# Patient Record
Sex: FEMALE | Race: WHITE | NOT HISPANIC OR LATINO | Employment: FULL TIME | URBAN - METROPOLITAN AREA
[De-identification: names, ages, dates, MRNs, and addresses within clinical notes are randomized per-mention and may not be internally consistent; named-entity substitution may affect disease eponyms.]

---

## 2021-08-05 ENCOUNTER — OFFICE VISIT (OUTPATIENT)
Dept: INTERNAL MEDICINE | Facility: CLINIC | Age: 26
End: 2021-08-05
Payer: COMMERCIAL

## 2021-08-05 VITALS
SYSTOLIC BLOOD PRESSURE: 115 MMHG | OXYGEN SATURATION: 97 % | HEART RATE: 78 BPM | DIASTOLIC BLOOD PRESSURE: 80 MMHG | TEMPERATURE: 98.5 F | WEIGHT: 181.9 LBS

## 2021-08-05 DIAGNOSIS — M79.661 PAIN IN RIGHT SHIN: ICD-10-CM

## 2021-08-05 DIAGNOSIS — Z00.00 PREVENTATIVE HEALTH CARE: ICD-10-CM

## 2021-08-05 DIAGNOSIS — Z76.89 ESTABLISHING CARE WITH NEW DOCTOR, ENCOUNTER FOR: Primary | ICD-10-CM

## 2021-08-05 PROCEDURE — 99202 OFFICE O/P NEW SF 15 MIN: CPT | Mod: GE | Performed by: STUDENT IN AN ORGANIZED HEALTH CARE EDUCATION/TRAINING PROGRAM

## 2021-08-05 RX ORDER — FLUOXETINE 10 MG/1
10 CAPSULE ORAL DAILY
COMMUNITY
End: 2023-04-07

## 2021-08-05 ASSESSMENT — ANXIETY QUESTIONNAIRES
3. WORRYING TOO MUCH ABOUT DIFFERENT THINGS: SEVERAL DAYS
1. FEELING NERVOUS, ANXIOUS, OR ON EDGE: SEVERAL DAYS
5. BEING SO RESTLESS THAT IT IS HARD TO SIT STILL: SEVERAL DAYS
2. NOT BEING ABLE TO STOP OR CONTROL WORRYING: SEVERAL DAYS
IF YOU CHECKED OFF ANY PROBLEMS ON THIS QUESTIONNAIRE, HOW DIFFICULT HAVE THESE PROBLEMS MADE IT FOR YOU TO DO YOUR WORK, TAKE CARE OF THINGS AT HOME, OR GET ALONG WITH OTHER PEOPLE: SOMEWHAT DIFFICULT
GAD7 TOTAL SCORE: 8
6. BECOMING EASILY ANNOYED OR IRRITABLE: MORE THAN HALF THE DAYS
7. FEELING AFRAID AS IF SOMETHING AWFUL MIGHT HAPPEN: SEVERAL DAYS

## 2021-08-05 ASSESSMENT — PAIN SCALES - GENERAL: PAINLEVEL: MILD PAIN (2)

## 2021-08-05 ASSESSMENT — PATIENT HEALTH QUESTIONNAIRE - PHQ9
5. POOR APPETITE OR OVEREATING: SEVERAL DAYS
SUM OF ALL RESPONSES TO PHQ QUESTIONS 1-9: 4

## 2021-08-05 NOTE — PROGRESS NOTES
PRIMARY CARE CENTER         HPI:       Michelle Navas is a 26 year old female who presents to [facility] care.     Past Medical History   Anxiety disorder on prozac and goes to therapy for the last year     Family History   Heart disease in both grandpas   HTN - Mother and maternal grandfather   T2DM - paternal grandmother and grandfather     Surgical History   Orthopedic surgery on her wrist ~2015    Social History   Tobacco use: Never used   Alcohol use: 4 drinks weekly   Drug use: None  Sexual activity: 1 male partner,  - no current concerns with birth control     HCM Needs:  - COVID vaccine   - PAP, last done in the last 1.5 years      Active concerns:  Concern for shin split, more pain walking in her shin and inner ankle of the for the last few months. No trauma but does run. Hit her tail bone many months back has some soreness intermittently denies any symptoms of weakness or loss of bladder or bowel function.     Problem, Medication and Allergy Lists were reviewed and are current.  Patient is a new patient to this clinic and so  I reviewed/updated the Past Medical History, the Family History and the Social History.          Review of Systems:     ROS  I have personally reviewed and updated the complete ROS on the day of the visit.           Physical Exam:   /80   Pulse 78   Temp 98.5  F (36.9  C) (Oral)   Wt 82.5 kg (181 lb 14.4 oz)   SpO2 97%   There is no height or weight on file to calculate BMI.  Vitals were reviewed       GENERAL APPEARANCE: healthy, alert and no distress     EYES: EOMI, PERRL     RESP: lungs clear to auscultation - no rales, rhonchi or wheezes     CV: regular rates and rhythm, normal S1 S2, no S3 or S4 and no murmur, click or rub     MS: extremities normal- no gross deformities noted, no evidence of inflammation in joints, FROM in all extremities.     SKIN: no suspicious lesions or rashes     NEURO: Normal strength and tone, sensory exam grossly normal, mentation  intact and speech normal     PSYCH: mentation appears normal. and affect normal/bright        Results:     Results from last visit:  No results found for any previous visit.     Assessment and Plan     Michelle was seen today for establish care.    Diagnoses and all orders for this visit:    Establishing care with new doctor, encounter for  Preventative health care  Up to date on HCM today.    Pain in right shin  No concerning findings on exam could be stress fracture. XR likely not sensitive to pick this up. Will refer to sports med for appropriate work up and pt has questions about her walking and running gait could also do PT.   -     Orthopedic  Referral; Future        Options for treatment and follow-up care were reviewed with the patient. Michelle Navas engaged in the decision making process and verbalized understanding of the options discussed and agreed with the final plan.    Mirit Mohsen Yacoup, MD  Aug 5, 2021    Pt was seen and plan of care discussed with Dr Rozina Allen.

## 2021-08-05 NOTE — NURSING NOTE
26 year old  Chief Complaint   Patient presents with     Establish Care     Pt is here to establish care.       Blood pressure 115/80, pulse 78, temperature 98.5  F (36.9  C), temperature source Oral, weight 82.5 kg (181 lb 14.4 oz), SpO2 97 %. There is no height or weight on file to calculate BMI.  BP completed using cuff size:      Analisa Marquez, WARD  August 5, 2021 12:47 PM

## 2021-08-05 NOTE — PATIENT INSTRUCTIONS
Primary Care Center Phone Number [phone]  Primary Care Center Medication Refill Request Information:  * Please contact your pharmacy regarding ANY request for medication refills.  ** [facility] Prescription Fax = [phone]  * Please allow 3 business days for routine medication refills.  * Please allow 5 business days for controlled substance medication refills.     Primary Care Center Test Result notification information:  *You will be notified with in 7-10 days of your appointment day regarding the results of your test.  If you are on MyChart you will be notified as soon as the provider has reviewed the results and signed off on them.

## 2021-08-06 ASSESSMENT — ANXIETY QUESTIONNAIRES: GAD7 TOTAL SCORE: 8

## 2021-08-06 NOTE — TELEPHONE ENCOUNTER
DIAGNOSIS: Pain and Twisting sensation in the R shin/ankle/leg   APPOINTMENT DATE: 8/12/2021   NOTES STATUS DETAILS   OFFICE NOTE from referring provider Internal [facility]:  8/5/21 - PCC OV with Dr. Mcelroy   OFFICE NOTE from other specialist N/A    DISCHARGE SUMMARY from hospital N/A    DISCHARGE REPORT from the ER Care [facility]:  7/26/13 - ED OV with Florentino Rosario NP   OPERATIVE REPORT N/A    EMG report N/A    MEDICATION LIST Internal    MRI N/A    DEXA (osteoporosis/bone health) N/A    CT SCAN N/A    XRAYS (IMAGES & REPORTS) N/A

## 2021-08-12 ENCOUNTER — OFFICE VISIT (OUTPATIENT)
Dept: ORTHOPEDICS | Facility: CLINIC | Age: 26
End: 2021-08-12
Payer: COMMERCIAL

## 2021-08-12 ENCOUNTER — PRE VISIT (OUTPATIENT)
Dept: ORTHOPEDICS | Facility: CLINIC | Age: 26
End: 2021-08-12

## 2021-08-12 VITALS — WEIGHT: 180 LBS | HEIGHT: 66 IN | BODY MASS INDEX: 28.93 KG/M2

## 2021-08-12 DIAGNOSIS — M79.661 PAIN IN RIGHT SHIN: Primary | ICD-10-CM

## 2021-08-12 DIAGNOSIS — S86.891A RIGHT MEDIAL TIBIAL STRESS SYNDROME, INITIAL ENCOUNTER: ICD-10-CM

## 2021-08-12 PROCEDURE — 99203 OFFICE O/P NEW LOW 30 MIN: CPT | Performed by: FAMILY MEDICINE

## 2021-08-12 ASSESSMENT — MIFFLIN-ST. JEOR: SCORE: 1563.22

## 2021-08-12 NOTE — PROGRESS NOTES
CHIEF COMPLAINT:  Pain of the Right Ankle       HISTORY OF PRESENT ILLNESS  Ms. Navas is a pleasant 26 year old year old female who presents to clinic today with right centeno pain.  Michelle explains that she is having medial shin pain from starting to run after 6 months off during winter.     Onset: gradual  Location: right medial lower leg  Quality:  aching and dull  Duration: 4 months   Severity: 5/10 at worst  Timing:intermittent episodes worse with walking and running  Modifying factors:  resting and non-use makes it better, movement and use makes it worse  Associated signs & symptoms: N/a  Previous similar pain: Has had shin splints prior but not this badly  Treatments to date: Rest, ice, ibuprofen     Additional history: as documented    Review of Systems:    Have you recently had a a fever, chills, weight loss? No    Do you have any vision problems? No    Do you have any chest pain or edema? No    Do you have any shortness of breath or wheezing?  No    Do you have stomach problems? No    Do you have any numbness or focal weakness? No    Do you have diabetes? No    Do you have problems with bleeding or clotting? No    Do you have an rashes or other skin lesions? No    MEDICAL HISTORY  There is no problem list on file for this patient.      Current Outpatient Medications   Medication Sig Dispense Refill     FLUoxetine (PROZAC) 10 MG capsule Take 10 mg by mouth daily       Norgestimate-Eth Estradiol (SPRINTEC 28 PO)          No Known Allergies    No family history on file.    Additional medical/Social/Surgical histories reviewed in [facility] and updated as appropriate.       PHYSICAL EXAM  There were no vitals taken for this visit.    General  - normal appearance, in no obvious distress  HEENT  - conjunctivae not injected, moist mucous membranes  CV  - normal pulse at dorsalis pedis and posterior tibial artery  Pulm  - normal respiratory pattern, non-labored  Musculoskeletal - lower leg right  - stance: normal gait  without limp, no obvious leg length discrepancy  - inspection: no swelling or effusion, normal bone and joint alignment, no obvious deformity  - palpation: tender posterior medial cortex at midshaft tibia extending distally just proximal to malleolus, no joint line tenderness, patella and patellar tendon non-tender. Nontender anterior tibial. No swelling in the region of tibia.  - ROM: FROM of knee and ankle, painless  - strength: 5/5 in ankle plantarflexion, dorsiflexion, eversion, inversion  Neuro  - no sensory or motor deficit, grossly normal coordination, normal muscle tone  Skin  - no ecchymosis, erythema, warmth, or induration, no obvious rash  Psych  - interactive, appropriate, normal mood and affect    IMAGING : Deferred     ASSESSMENT & PLAN  Ms. Navas is a 26 year old year old female who presents to clinic today with subacute medial tibial pain diffusely about the right lower extremity. Findings consistent with medial tibial stress syndrome.    Diagnosis: Medial tibial stress syndrome of right lower extremity    Treatment options discussed at this time I like her to start a robust home exercise program for stretching her lower leg musculature as well as strengthening. We reviewed crosstraining methods. Footwear was also reviewed and she does have the appropriate running shoe. She should gradually increase her high-impact activity as pain subsides.    Given duration of her symptoms, I did encourage her to attend formal physical therapy, but she would like to give some effort in the home exercise program first. If no improvement in the next 6 weeks would like to see her back, obtain x-rays if no improvement.    It was a pleasure seeing Michelle today.    Florentino Mckeon DO, CAQSM  Primary Care Sports Medicine

## 2021-08-12 NOTE — LETTER
8/12/2021      RE: Michelle Navas  [address] [place] [address]       CHIEF COMPLAINT:  Pain of the Right Ankle       HISTORY OF PRESENT ILLNESS  Ms. Navas is a pleasant 26 year old year old female who presents to clinic today with right centeno pain.  Michelle explains that she is having medial shin pain from starting to run after 6 months off during winter.     Onset: gradual  Location: right medial lower leg  Quality:  aching and dull  Duration: 4 months   Severity: 5/10 at worst  Timing:intermittent episodes worse with walking and running  Modifying factors:  resting and non-use makes it better, movement and use makes it worse  Associated signs & symptoms: N/a  Previous similar pain: Has had shin splints prior but not this badly  Treatments to date: Rest, ice, ibuprofen     Additional history: as documented    Review of Systems:    Have you recently had a a fever, chills, weight loss? No    Do you have any vision problems? No    Do you have any chest pain or edema? No    Do you have any shortness of breath or wheezing?  No    Do you have stomach problems? No    Do you have any numbness or focal weakness? No    Do you have diabetes? No    Do you have problems with bleeding or clotting? No    Do you have an rashes or other skin lesions? No    MEDICAL HISTORY  There is no problem list on file for this patient.      Current Outpatient Medications   Medication Sig Dispense Refill     FLUoxetine (PROZAC) 10 MG capsule Take 10 mg by mouth daily       Norgestimate-Eth Estradiol (SPRINTEC 28 PO)          No Known Allergies    No family history on file.    Additional medical/Social/Surgical histories reviewed in [facility] and updated as appropriate.       PHYSICAL EXAM  There were no vitals taken for this visit.    General  - normal appearance, in no obvious distress  HEENT  - conjunctivae not injected, moist mucous membranes  CV  - normal pulse at dorsalis pedis and posterior tibial artery  Pulm  - normal  respiratory pattern, non-labored  Musculoskeletal - lower leg right  - stance: normal gait without limp, no obvious leg length discrepancy  - inspection: no swelling or effusion, normal bone and joint alignment, no obvious deformity  - palpation: tender posterior medial cortex at midshaft tibia extending distally just proximal to malleolus, no joint line tenderness, patella and patellar tendon non-tender. Nontender anterior tibial. No swelling in the region of tibia.  - ROM: FROM of knee and ankle, painless  - strength: 5/5 in ankle plantarflexion, dorsiflexion, eversion, inversion  Neuro  - no sensory or motor deficit, grossly normal coordination, normal muscle tone  Skin  - no ecchymosis, erythema, warmth, or induration, no obvious rash  Psych  - interactive, appropriate, normal mood and affect    IMAGING : Deferred     ASSESSMENT & PLAN  Ms. Navas is a 26 year old year old female who presents to clinic today with subacute medial tibial pain diffusely about the right lower extremity. Findings consistent with medial tibial stress syndrome.    Diagnosis: Medial tibial stress syndrome of right lower extremity    Treatment options discussed at this time I like her to start a robust home exercise program for stretching her lower leg musculature as well as strengthening. We reviewed crosstraining methods. Footwear was also reviewed and she does have the appropriate running shoe. She should gradually increase her high-impact activity as pain subsides.    Given duration of her symptoms, I did encourage her to attend formal physical therapy, but she would like to give some effort in the home exercise program first. If no improvement in the next 6 weeks would like to see her back, obtain x-rays if no improvement.    It was a pleasure seeing Michelle today.    Florentino Mckeon DO, [facility]  Primary Care Sports Medicine      Florentino Mckeon DO

## 2021-08-12 NOTE — PATIENT INSTRUCTIONS
SHIN SPLINTS (MEDIAL TIBIAL STRESS SYNDROME) HANDOUT    WHAT IS SHIN PAIN?    Shin pain is pain on the front of your lower leg between the knee and the ankle. It can hurt directly over your shinbone (tibia) or over the muscles that are on the inner or outer side of the tibia. Shin pain has often been called shin splints.    HOW DOES IT OCCUR?    Shin pain generally occurs from overuse. This problem can come from irritation of the muscles or other tissues in the lower leg or from a stress fracture. This injury is most common in runners who increase their mileage or the intensity of their running, or who change the surface on which they are running.    When you walk or run your foot normally flattens out a small amount when it strikes the ground. If your foot flattens out more than normal it is called over-pronation. Over-pronation can contribute to shin pain.    Some specific conditions that cause shin pain include:    Stress fracture: This is a hairline crack in one of the lower leg bones, the tibia or fibula.  Medial tibial stress syndrome: This is when the muscles that attach to the inner side of your tibia are inflamed.    Compartment syndrome: Your anterior compartment is an area in your leg that contains the muscles that point your foot and toes toward your body. Your lateral compartment contains muscles that move your foot and ankle away from your body. Your posterior compartment contains the calf muscles which point your foot downwards. When a compartment is overused the muscles will become painful.    WHAT ARE THE SYMPTOMS?    You have pain over the front part of your lower leg. You may have pain during exercise, at rest, or both. Stress fractures of the tibia cause pain directly over your shinbone. It will hurt to touch the part of the bone that is fractured. Stress fractures of the fibula cause pain on the outer side of your lower leg. With medial tibial stress syndrome, you will have pain and tenderness  along the edge of the shinbone, especially along the muscles. With compartment syndrome the muscles in that area will be painful. Blood vessels and nerves are also in the anterior compartment. If the muscles in this compartment become swollen during exercise they may irritate these nerves or blood vessels and your foot may become weak, numb, or cold.    HOW IS IT DIAGNOSED?    Your healthcare provider will examine your lower leg. He or she will decide which part of your shin is the source of the pain. Your provider may watch you walk or run to see if you have problems with over-pronation. You may need an X-ray, MRI, or a bone scan to check for stress fractures. If your provider thinks you have compartment syndrome you may need a test that measures the pressure in your lower leg compartments. This is done using a needle attached to a measuring device. The test is done at rest and then again after exercise.    HOW IS IT TREATED?    Treatment may include:    Put an ice pack, gel pack, or package of frozen vegetables, wrapped in a cloth on the area every 3 to 4 hours, for up to 20 minutes at a time.    You could also do ice massage. To do this, first freeze water in a Styrofoam cup, then peel the top of the cup away to expose the ice. Hold the bottom of the cup and rub the ice over the area for 5 to 10 minutes. Do this several times a day while you have pain.    Raise your legs on a pillow when you sit or lie down.    Take an anti-inflammatory medicine such as ibuprofen, or other medicine as directed by your provider. Nonsteroidal anti-inflammatory medicines (NSAIDs) may cause stomach bleeding and other problems. These risks increase with age. Read the label and take as directed. Unless recommended by your healthcare provider, do not take for more than 10 days.    Your healthcare provider may recommend arch supports, called orthotics. You can buy orthotics at a pharmacy or athletic shoe store or they can be custom-made.  Arch supports (orthotics) help correct over-pronation.    Follow your provider s instructions for doing exercises to help you recover.    While you are recovering from your injury, you will need to change your sport or activity to one that does not make your condition worse. For example, you may need to bicycle or swim instead of run. When you begin to run again, you should wear good shoes and run on soft surfaces.    WHEN CAN I RETURN TO MY NORMAL ACTIVITIES?    Everyone recovers from an injury at a different rate. Return to your activities depends on how soon your leg recovers, not by how many days or weeks it has been since your injury has occurred. In general, the longer you have symptoms before you start treatment, the longer it will take to get better. The goal is to return to your normal activities as soon as is safely possible. If you return too soon you may worsen your injury.    You may safely return to your activities when, starting from the top of the list and progressing to the end, each of the following is true:    You have full range of motion in the injured leg compared to the uninjured leg.  You have full strength of the injured leg compared to the uninjured leg.  You can walk straight ahead without pain or limping.    HOW CAN I PREVENT SHIN PAIN?    Since shin pain usually occurs from overuse, be sure to start your activities gradually.  Wear shoes with proper padding and support.  Run on softer surfaces.  Warm up properly and stretch the muscles in the front of your leg and in your calf.  Do not keep running while you have shin pain or it will take longer for the pain to go away.  Try cross training to limit the stress on your shins. Do an exercise that does not place stress on the injured tissue, such as swimming. This will allow you to keep exercising while your injury heals.    Developed by [employer].  Published by [employer].  Copyright  2014 [employer] and/or one of its  subsidiaries. All rights reserved.    Exercises: You can start doing all of these exercises right away.    Towel stretch: Sit on a hard surface with your injured leg stretched out in front of you. Loop a towel around your toes and the ball of your foot and pull the towel toward your body keeping your leg straight. Hold this position for 15 to 30 seconds and then relax. Repeat 3 times.  When you don't feel much of a stretch using the towel, do the standing calf stretch instead.    Standing calf stretch: Stand facing a wall with your hands on the wall at about eye level. Keep your injured leg back with your heel on the floor. Keep the other leg forward with the knee bent. Turn your back foot slightly inward (as if you were pigeon-toed). Slowly lean into the wall until you feel a stretch in the back of your calf. Hold the stretch for 15 to 30 seconds. Return to the starting position. Repeat 3 times. Do this exercise several times each day.    Anterior compartment stretch: Stand sideways next to a wall or chair with your injured leg further from the wall or chair. Put one hand on the wall or chair for balance. Bend the knee of your injured leg and grab the front of your foot. Bend the front of the foot toward your heel. You should feel a stretch in the front of your shin. Hold for 15 to 30 seconds. Repeat 3 times.    Resisted ankle dorsiflexion: Tie a knot in one end of the elastic tubing and shut the knot in a door. Tie a loop in the other end of the tubing and put the foot on your injured side through the loop so that the tubing goes around the top of the foot. Sit facing the door with your injured leg straight out in front of you. Move away from the door until there is tension in the tubing. Keeping your leg straight, pull the top of your foot toward your body, stretching the tubing. Slowly return to the starting position. Do 2 sets of 15.    Ankle range of motion: Sit or lie down with your legs straight and your  knees pointing toward the ceiling. Point your toes on your injured side toward your nose, then away from your body. Point your toes in toward your other foot and then out away from your other foot. Finally, move the top of your foot in circles. Move only your foot and ankle. Don't move your leg. Repeat 10 times in each direction. Push hard in all directions.  Heel raise: Stand behind a chair or counter with both feet flat on the floor. Using the chair or counter as a support, rise up onto your toes and hold for 5 seconds. Then slowly lower yourself down without holding onto the support. (It's OK to keep holding onto the support if you need to.) When this exercise becomes less painful, try doing this exercise while you are standing on the injured leg only. Repeat 15 times. Do 2 sets of 15. Rest 30 seconds between sets.    Resisted ankle inversion: Sit with your legs stretched out in front of you. Cross the ankle of your uninjured leg over your other ankle. Wrap elastic tubing around the ball of the foot of your injured leg and then loop it around your other foot so that the tubing is anchored there at one end. Hold the other end of the tubing in your hand. Turn the foot of your injured leg inward and upward. This will stretch the tubing. Return to the starting position. Do 2 sets of 15.  Standing toe raise: Stand with your feet flat on the floor. Rock back onto your heels and lift your toes off the floor. Hold for 5 seconds and then put your toes back on the floor. Do 2 sets of 15.    Balance and reach exercises: Stand next to a chair with your injured leg farther from the chair. The chair will provide support if you need it. Stand on the foot of your injured leg and bend your knee slightly. Try to raise the arch of this foot while keeping your big toe on the floor. Keep your foot in this position.  With the hand that is farther away from the chair, reach forward in front of you by bending at the waist. Avoid bending  your knee any more as you do this. Repeat this 15 times. To make the exercise more challenging, reach farther in front of you. Do 2 sets of 15.    While keeping your arch raised, reach the hand that is farther away from the chair across your body toward the chair. The farther you reach, the more challenging the exercise. Do 2 sets of 15.    Resisted hip abduction: Stand sideways near a door with your injured side further from the door. Tie elastic tubing around the ankle on your injured side. Knot the other end of the tubing and close the knot in the door near the floor. Pull the tubing out to the side, keeping your leg straight. Return to the starting position. Do 2 sets of 15. For more resistance, move farther away from the door.  Repeat this exercise for the other leg.            You may also want to buy a pair of nonprescription foot orthotics from a pharmacy or local sporting goods store. Gigi  and Dr. Francia dennison  are examples of orthotics that you can buy without a prescription.    Developed by [employer].  Published by [employer].  Copyright  2014 [employer] and/or one of its subsidiaries. All rights reserved.

## 2021-10-13 ENCOUNTER — THERAPY VISIT (OUTPATIENT)
Dept: PHYSICAL THERAPY | Facility: CLINIC | Age: 26
End: 2021-10-13
Payer: COMMERCIAL

## 2021-10-13 DIAGNOSIS — M79.661 PAIN IN RIGHT SHIN: ICD-10-CM

## 2021-10-13 PROCEDURE — 97110 THERAPEUTIC EXERCISES: CPT | Mod: GP | Performed by: PHYSICAL THERAPIST

## 2021-10-13 PROCEDURE — 97161 PT EVAL LOW COMPLEX 20 MIN: CPT | Mod: GP | Performed by: PHYSICAL THERAPIST

## 2021-10-13 PROCEDURE — 97530 THERAPEUTIC ACTIVITIES: CPT | Mod: GP | Performed by: PHYSICAL THERAPIST

## 2021-10-13 NOTE — PROGRESS NOTES
KEY PT FINDINGS:  1) Poor arch control in SL stance + SL squatting  2) Weakness through inversion + eversion  3) Poor mechanics through single leg squat    Physical Therapy Initial Evaluation: Subjective History     Injury/Condition Details:  Presenting Complaint Right Smith    Onset Timing/Date June 2021   Mechanism Started getting shin splints in June.   Has been running on and off for years, took a 6 month break. It was fine for a while and then they started. Stopped running in June and started running recently 2 weeks ago.     Previously was doing a jogging here or there - runs casually to be outside. Has been trying to do stretches - they have been helping.     Does cross training: low impact cardio - youtube videos.   Has done jumping stuff in the past - burpees and such - had symptoms.     In the past 2 weeks, minimal symptoms with her runs as she has been taking it easy.   Pain pattern: started within 1 minute of running, would get worse, would have residual pain the next day.      Symptom Behavior Details    Primary Symptoms Sporadic symptoms; Activity/position dependent, pain (Location: medial tibia, goes from the ankle to the knee, not into the foot, Quality: Aching/Throbbing)   Worst Pain 5-6/10 (with running)   Symptom Provocators Walking, impact with burpees   Best Pain 0/10    Symptom Relievers Activity modification   Time of day dependent? No   Recent symptom change? 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    Orthopaedic history See Epic Chart   Notable medical history See Epic Chart   Patient Reported Health good       Foot/Ankle Physical Therapy Examination    Dynamic Movement Screen:  2 leg stance: Mild decrease in arch height bilaterally   2 leg squat: Mild anterior knee  excursion, weight predominantly in the toes.     1 leg stance: Right: Proprio deficits, arch drops and lacks control; Left: Arch does drop but does not shift in and out of arch height  1 leg squat:   Right: Proprio deficit, femoral IR/Add   Left: Proprio deficit, lacking trunk control    Gait: Non-antalgic, lacking arch control.     Range of motion:     Inclinometer Left (degrees) Right (degrees)   Knee Straight     Knee Bent 16 cm 14 cm   Toes Extended/Knee Bent 10 deg 14 deg     Palpation:     Tender to palpation at the following structures: Posterior tib, tibia shaft - more mid shaft for both the muscle + the bone.   NOT tender to palpation at the following structures: Navicular, posterior to the medial malleolus     Resisted Testing:     Right Left   Plantarflexion (Toe Raises) - -   Dorsiflexion - -   Eversion Weak Weak   Inversion Weak Weak   Great Toe Extension - -   Great Toe Flexion - -     Foot Screen:   Normal  Lower Extremity Muscle Strength (x/5): deferred  Assessment/Plan:  Patient is a 26 year old female with right side ankle complaints.    Patient has the following significant findings with corresponding treatment plan.                Diagnosis 1:  Right shin pain  Pain -  hot/cold therapy, manual therapy, STS, splint/taping/bracing/orthotics, self management and education  Decreased ROM/flexibility - manual therapy, therapeutic exercise and home program  Decreased strength - therapeutic exercise, therapeutic activities and home program  Impaired balance - neuro re-education, therapeutic activities and home program  Decreased proprioception - neuro re-education, therapeutic activities and home program  Decreased function - therapeutic activities and home program    Therapy Evaluation Codes:   1) History comprised of:   Personal factors that impact the plan of care:      None.    Comorbidity factors that impact the plan of care are:      None.     Medications impacting care: None.  2) Examination of  Body Systems comprised of:   Body structures and functions that impact the plan of care:      Ankle.   Activity limitations that impact the plan of care are:      Jumping, Running and Walking.  3) Clinical presentation characteristics are:   Stable/Uncomplicated.  4) Decision-Making    Low complexity using standardized patient assessment instrument and/or measureable assessment of functional outcome.  Cumulative Therapy Evaluation is: Low complexity.    Previous and current functional limitations:  (See Goal Flow Sheet for this information)    Short term and Long term goals: (See Goal Flow Sheet for this information)     Communication ability:  Patient appears to be able to clearly communicate and understand verbal and written communication and follow directions correctly.  Treatment Explanation - The following has been discussed with the patient:   RX ordered/plan of care  Anticipated outcomes  Possible risks and side effects  This patient would benefit from PT intervention to resume normal activities.   Rehab potential is good.    Frequency:  1 X week, once daily  Duration:  for 6 weeks  Discharge Plan:  Achieve all LTG.  Independent in home treatment program.  Reach maximal therapeutic benefit.    Please refer to the daily flowsheet for treatment today, total treatment time and time spent performing 1:1 timed codes.

## 2021-11-10 ENCOUNTER — THERAPY VISIT (OUTPATIENT)
Dept: PHYSICAL THERAPY | Facility: CLINIC | Age: 26
End: 2021-11-10
Payer: COMMERCIAL

## 2021-11-10 DIAGNOSIS — M79.661 PAIN IN RIGHT SHIN: Primary | ICD-10-CM

## 2021-11-10 PROCEDURE — 97110 THERAPEUTIC EXERCISES: CPT | Mod: GP | Performed by: PHYSICAL THERAPY ASSISTANT

## 2022-01-30 ENCOUNTER — HEALTH MAINTENANCE LETTER (OUTPATIENT)
Age: 27
End: 2022-01-30

## 2022-02-08 ENCOUNTER — OFFICE VISIT (OUTPATIENT)
Dept: ORTHOPEDICS | Facility: CLINIC | Age: 27
End: 2022-02-08
Payer: COMMERCIAL

## 2022-02-08 ENCOUNTER — ANCILLARY PROCEDURE (OUTPATIENT)
Dept: GENERAL RADIOLOGY | Facility: CLINIC | Age: 27
End: 2022-02-08
Attending: FAMILY MEDICINE
Payer: COMMERCIAL

## 2022-02-08 VITALS — HEIGHT: 66 IN | WEIGHT: 180 LBS | BODY MASS INDEX: 28.93 KG/M2

## 2022-02-08 DIAGNOSIS — M89.8X6 PAIN OF RIGHT TIBIA: Primary | ICD-10-CM

## 2022-02-08 DIAGNOSIS — M79.661 PAIN IN RIGHT SHIN: ICD-10-CM

## 2022-02-08 PROCEDURE — 73590 X-RAY EXAM OF LOWER LEG: CPT | Mod: RT | Performed by: RADIOLOGY

## 2022-02-08 PROCEDURE — 99213 OFFICE O/P EST LOW 20 MIN: CPT | Performed by: FAMILY MEDICINE

## 2022-02-08 ASSESSMENT — MIFFLIN-ST. JEOR: SCORE: 1563.22

## 2022-02-08 NOTE — LETTER
"  2/8/2022      RE: Michelle Colunga  [address] [place] [address]       ESTABLISHED PATIENT FOLLOW-UP:  Pain and Follow Up of the Right Lower Leg       HISTORY OF PRESENT ILLNESS  Ms. Colunga is a pleasant 26 year old year old female who presents to clinic today for follow-up of R shin pain.    Mentions the pain  Had initially been improving with PT and HEP.  Does a fair amount of weight lifting and will incorporate rehab into this.  Pain in her shin never dissipated, now it is more localized to a specific location on her tibia rather than diffuse tibial.    Increased pain with prolonged walking. Notices Sx pretty consistently. Unable to return to running and has backed down d/t pain. Interested in incorporate more dynamic type of cardio which is restricted by her current Sx.    Date of injury: 4/2021  Date last seen: 8/12/21  Following Therapeutic Plan: yes, PT  Pain: unchanged  Function: unchanged     Eats well balanced meals. Dairy intake. Regular menstrual cycles. Does not have a history of stress fractures.    Additional medical/Social/Surgical histories reviewed in [facility] and updated as appropriate.    REVIEW OF SYSTEMS (2/8/2022)  CONSTITUTIONAL: Denies fever and weight loss  GASTROINTESTINAL: Denies abdominal pain, nausea, vomiting  MUSCULOSKELETAL: See HPI  SKIN: Denies any recent rash or lesion  NEUROLOGICAL: Denies numbness or focal weakness     PHYSICAL EXAM  Ht 1.676 m (5' 6\")   Wt 81.6 kg (180 lb)   BMI 29.05 kg/m      General  - normal appearance, in no obvious distress  Musculoskeletal - lower leg right  - stance: normal gait without limp, no obvious leg length discrepancy  - inspection: no swelling or effusion, normal bone and joint alignment, no obvious deformity  - palpation: 3 cm tender region at anterior cortex at midshaft tibia approximately 25cm proximal to medial malleolus, no joint line tenderness, patella and patellar tendon non-tender. Nontender anterior tibial. No swelling in " the region of tibia.  - ROM: FROM of knee and ankle, painless  - strength: 5/5 in ankle plantarflexion, dorsiflexion, eversion, inversion  (+) Hop test right  Neuro  - no sensory or motor deficit, grossly normal coordination, normal muscle tone  Skin  - no ecchymosis, erythema, warmth, or induration, no obvious rash  Psych  - interactive, appropriate, normal mood and affect    IMAGING : XR tibia/fibula right. Final results and radiologist's interpretation, available in the [facility] health record. Images were reviewed with the patient/family members in the office today. My personal interpretation of the performed imaging is no acute osseous abnormality     ASSESSMENT & PLAN  Ms. Colunga is a 26 year old year old female who presents to clinic today with Pain and Follow Up of the Right Lower Leg    Ongoing  Pain since summer of 2021, recalcitrant to HEP and formal physical therapy, activity modification.  No overt risk factors for metabolic bone disease. However, concern now tibial stress fracture rather than previous MTSS.    -MRI right tibia/fibula warranted  -Follow up after MRI to discuss pathology and determine treatment plan.    It was a pleasure seeing Almas Mckeon DO, [facility]  Primary Care Sports Medicine

## 2022-02-08 NOTE — PROGRESS NOTES
"ESTABLISHED PATIENT FOLLOW-UP:  Pain and Follow Up of the Right Lower Leg       HISTORY OF PRESENT ILLNESS  Ms. Colunga is a pleasant 26 year old year old female who presents to clinic today for follow-up of R shin pain.    Mentions the pain  Had initially been improving with PT and HEP.  Does a fair amount of weight lifting and will incorporate rehab into this.  Pain in her shin never dissipated, now it is more localized to a specific location on her tibia rather than diffuse tibial.    Increased pain with prolonged walking. Notices Sx pretty consistently. Unable to return to running and has backed down d/t pain. Interested in incorporate more dynamic type of cardio which is restricted by her current Sx.    Date of injury: 4/2021  Date last seen: 8/12/21  Following Therapeutic Plan: yes, PT  Pain: unchanged  Function: unchanged     Eats well balanced meals. Dairy intake. Regular menstrual cycles. Does not have a history of stress fractures.    Additional medical/Social/Surgical histories reviewed in [facility] and updated as appropriate.    REVIEW OF SYSTEMS (2/8/2022)  CONSTITUTIONAL: Denies fever and weight loss  GASTROINTESTINAL: Denies abdominal pain, nausea, vomiting  MUSCULOSKELETAL: See HPI  SKIN: Denies any recent rash or lesion  NEUROLOGICAL: Denies numbness or focal weakness     PHYSICAL EXAM  Ht 1.676 m (5' 6\")   Wt 81.6 kg (180 lb)   BMI 29.05 kg/m      General  - normal appearance, in no obvious distress  Musculoskeletal - lower leg right  - stance: normal gait without limp, no obvious leg length discrepancy  - inspection: no swelling or effusion, normal bone and joint alignment, no obvious deformity  - palpation: 3 cm tender region at anterior cortex at midshaft tibia approximately 25cm proximal to medial malleolus, no joint line tenderness, patella and patellar tendon non-tender. Nontender anterior tibial. No swelling in the region of tibia.  - ROM: FROM of knee and ankle, painless  - strength: 5/5 in " ankle plantarflexion, dorsiflexion, eversion, inversion  (+) Hop test right  Neuro  - no sensory or motor deficit, grossly normal coordination, normal muscle tone  Skin  - no ecchymosis, erythema, warmth, or induration, no obvious rash  Psych  - interactive, appropriate, normal mood and affect    IMAGING : XR tibia/fibula right. Final results and radiologist's interpretation, available in the [facility] health record. Images were reviewed with the patient/family members in the office today. My personal interpretation of the performed imaging is no acute osseous abnormality     ASSESSMENT & PLAN  Ms. Colunga is a 26 year old year old female who presents to clinic today with Pain and Follow Up of the Right Lower Leg    Ongoing  Pain since summer of 2021, recalcitrant to HEP and formal physical therapy, activity modification.  No overt risk factors for metabolic bone disease. However, concern now tibial stress fracture rather than previous MTSS.    -MRI right tibia/fibula warranted  -Follow up after MRI to discuss pathology and determine treatment plan.    It was a pleasure seeing Michelle.    Florentino Mckeon DO, CAQSM  Primary Care Sports Medicine

## 2022-02-11 ENCOUNTER — ANCILLARY PROCEDURE (OUTPATIENT)
Dept: MRI IMAGING | Facility: CLINIC | Age: 27
End: 2022-02-11
Attending: FAMILY MEDICINE
Payer: COMMERCIAL

## 2022-02-11 DIAGNOSIS — M89.8X6 PAIN OF RIGHT TIBIA: ICD-10-CM

## 2022-02-11 PROCEDURE — 73718 MRI LOWER EXTREMITY W/O DYE: CPT | Mod: RT | Performed by: RADIOLOGY

## 2022-02-15 ENCOUNTER — OFFICE VISIT (OUTPATIENT)
Dept: ORTHOPEDICS | Facility: CLINIC | Age: 27
End: 2022-02-15
Payer: COMMERCIAL

## 2022-02-15 VITALS — BODY MASS INDEX: 28.93 KG/M2 | WEIGHT: 180 LBS | HEIGHT: 66 IN

## 2022-02-15 DIAGNOSIS — M89.8X6 PAIN OF RIGHT TIBIA: Primary | ICD-10-CM

## 2022-02-15 PROCEDURE — 99213 OFFICE O/P EST LOW 20 MIN: CPT | Performed by: FAMILY MEDICINE

## 2022-02-15 ASSESSMENT — MIFFLIN-ST. JEOR: SCORE: 1563.22

## 2022-02-15 NOTE — LETTER
"  2/15/2022      RE: Michelle Colunga  [address] [place] [address]       ESTABLISHED PATIENT FOLLOW-UP:  Follow Up of the Right Lower Leg       HISTORY OF PRESENT ILLNESS  Ms. Colunga is a pleasant 26 year old year old female who presents to clinic today for follow-up for MRI results    Date of injury: 4/20/21  Date last seen: 2/8/22  Following Therapeutic Plan: MRI  Pain: Improved  Function: Improved overall    Patient notes that her symptoms are mild overall and states that she was mostly helping that MRI which show resolving symptoms as it has been improving.  She has been working with PT and is now performing home exercises and strengthening.    Additional medical/Social/Surgical histories reviewed in [facility] and updated as appropriate.    REVIEW OF SYSTEMS (2/15/2022)  CONSTITUTIONAL: Denies fever and weight loss  GASTROINTESTINAL: Denies abdominal pain, nausea, vomiting  MUSCULOSKELETAL: See HPI  SKIN: Denies any recent rash or lesion  NEUROLOGICAL: Denies numbness or focal weakness     PHYSICAL EXAM  Ht 1.676 m (5' 6\")   Wt 81.6 kg (180 lb)   BMI 29.05 kg/m      General  - normal appearance, in no obvious distress  Musculoskeletal - lower leg right  - stance: normal gait without limp, no obvious leg length discrepancy  - inspection: no swelling or effusion, normal bone and joint alignment, no obvious deformity  - palpation: Mild 3 cm tender region at anterior cortex at midshaft tibia approximately 25cm proximal to medial malleolus, no joint line tenderness, patella and patellar tendon non-tender. Nontender anterior tibial. No swelling in the region of tibia.  - ROM: FROM of knee and ankle, painless  - strength: 5/5 in ankle plantarflexion, dorsiflexion, eversion, inversion  Neuro  - no sensory or motor deficit, grossly normal coordination, normal muscle tone  Skin  - no ecchymosis, erythema, warmth, or induration, no obvious rash  Psych  - interactive, appropriate, normal mood and " affect    IMAGING:  MR right tibia/fibula without contrast 2/11/2022 9:57 AM     Techniques: Multiplanar multisequence imaging of the right  tibia/fibula was obtained without  administration of intra-articular  or intravenous contrast using routing protocol.     History: Tenderness appx 24cm from tip of medial malleolus.; Pain of  right tibia     Comparison: 2/8/2022     Findings:     A marker is placed at anteromedial aspect of right  leg approximately  16 cm from knee joint line.     Osseous structures     Osseous structures: No fracture, stress reaction, avascular necrosis,  or focal osseous lesion is seen.     Muscles  Muscles: The visualized muscles are unremarkable without edema or  atrophy.     Joint/Periarticular soft tissue     Internal derangement of joint assessment are limited owing to chosen  field of view.     Other Findings:  Mild soft tissue edema overlying the tibial tuberosity.                                                                      Impression: No evidence of medial tibial stress syndrome.     I have personally reviewed the examination and initial interpretation  and I agree with the findings.     IQRA LA     ASSESSMENT & PLAN  Ms. Colunga is a 26 year old year old female who presents to clinic today with Follow Up of the Right Lower Leg    MRI reveals no evidence for medial tibial stress syndrome, stress fracture or other abnormality in region of skin marker.    Diagnosis:   Pain of right tibia6    -Discussed lack of MR findings to suggest abnormal bone, MTSS or other.  She feels this may be partially due to her lack of confidence with previous diagnosis of MTSS.  -Continue with PT/HEP  -She may benefit from a running gait analysis that she wants to return to running at some point  -No specific activity restrictions given normal MRI.  I do think it be best for her to work through this perceived pain by returning to activities slowly but confidently knowing MRI is  normal.  -Follow up as needed    It was a pleasure seeing Michelle.      Florentino Mckeon DO, CAQSM  Primary Care Sports Medicine

## 2022-02-15 NOTE — PROGRESS NOTES
"ESTABLISHED PATIENT FOLLOW-UP:  Follow Up of the Right Lower Leg       HISTORY OF PRESENT ILLNESS  Ms. Colunga is a pleasant 26 year old year old female who presents to clinic today for follow-up for MRI results    Date of injury: 4/20/21  Date last seen: 2/8/22  Following Therapeutic Plan: MRI  Pain: Improved  Function: Improved overall    Patient notes that her symptoms are mild overall and states that she was mostly helping that MRI which show resolving symptoms as it has been improving.  She has been working with PT and is now performing home exercises and strengthening.    Additional medical/Social/Surgical histories reviewed in [facility] and updated as appropriate.    REVIEW OF SYSTEMS (2/15/2022)  CONSTITUTIONAL: Denies fever and weight loss  GASTROINTESTINAL: Denies abdominal pain, nausea, vomiting  MUSCULOSKELETAL: See HPI  SKIN: Denies any recent rash or lesion  NEUROLOGICAL: Denies numbness or focal weakness     PHYSICAL EXAM  Ht 1.676 m (5' 6\")   Wt 81.6 kg (180 lb)   BMI 29.05 kg/m      General  - normal appearance, in no obvious distress  Musculoskeletal - lower leg right  - stance: normal gait without limp, no obvious leg length discrepancy  - inspection: no swelling or effusion, normal bone and joint alignment, no obvious deformity  - palpation: Mild 3 cm tender region at anterior cortex at midshaft tibia approximately 25cm proximal to medial malleolus, no joint line tenderness, patella and patellar tendon non-tender. Nontender anterior tibial. No swelling in the region of tibia.  - ROM: FROM of knee and ankle, painless  - strength: 5/5 in ankle plantarflexion, dorsiflexion, eversion, inversion  Neuro  - no sensory or motor deficit, grossly normal coordination, normal muscle tone  Skin  - no ecchymosis, erythema, warmth, or induration, no obvious rash  Psych  - interactive, appropriate, normal mood and affect    IMAGING:  MR right tibia/fibula without contrast 2/11/2022 9:57 AM     Techniques: " Multiplanar multisequence imaging of the right  tibia/fibula was obtained without  administration of intra-articular  or intravenous contrast using routing protocol.     History: Tenderness appx 24cm from tip of medial malleolus.; Pain of  right tibia     Comparison: 2/8/2022     Findings:     A marker is placed at anteromedial aspect of right  leg approximately  16 cm from knee joint line.     Osseous structures     Osseous structures: No fracture, stress reaction, avascular necrosis,  or focal osseous lesion is seen.     Muscles  Muscles: The visualized muscles are unremarkable without edema or  atrophy.     Joint/Periarticular soft tissue     Internal derangement of joint assessment are limited owing to chosen  field of view.     Other Findings:  Mild soft tissue edema overlying the tibial tuberosity.                                                                      Impression: No evidence of medial tibial stress syndrome.     I have personally reviewed the examination and initial interpretation  and I agree with the findings.     IQRA LA     ASSESSMENT & PLAN  Ms. Colunga is a 26 year old year old female who presents to clinic today with Follow Up of the Right Lower Leg    MRI reveals no evidence for medial tibial stress syndrome, stress fracture or other abnormality in region of skin marker.    Diagnosis:   Pain of right tibia6    -Discussed lack of MR findings to suggest abnormal bone, MTSS or other.  She feels this may be partially due to her lack of confidence with previous diagnosis of MTSS.  -Continue with PT/HEP  -She may benefit from a running gait analysis that she wants to return to running at some point  -No specific activity restrictions given normal MRI.  I do think it be best for her to work through this perceived pain by returning to activities slowly but confidently knowing MRI is normal.  -Follow up as needed    It was a pleasure seeing Michelle.    Florentino Mckeon, , CAQSM  Primary  Care Sports Medicine

## 2022-04-20 ENCOUNTER — THERAPY VISIT (OUTPATIENT)
Dept: PHYSICAL THERAPY | Facility: CLINIC | Age: 27
End: 2022-04-20
Payer: COMMERCIAL

## 2022-04-20 DIAGNOSIS — G89.29 CHRONIC PAIN OF RIGHT ANKLE: ICD-10-CM

## 2022-04-20 DIAGNOSIS — M25.571 CHRONIC PAIN OF RIGHT ANKLE: ICD-10-CM

## 2022-04-20 PROCEDURE — 97530 THERAPEUTIC ACTIVITIES: CPT | Mod: GP | Performed by: PHYSICAL THERAPIST

## 2022-04-20 PROCEDURE — 97110 THERAPEUTIC EXERCISES: CPT | Mod: GP | Performed by: PHYSICAL THERAPIST

## 2022-04-20 PROCEDURE — 97161 PT EVAL LOW COMPLEX 20 MIN: CPT | Mod: GP | Performed by: PHYSICAL THERAPIST

## 2022-04-20 NOTE — PROGRESS NOTES
"Physical Therapy Initial Evaluation: Subjective History     Injury/Condition Details:  Presenting Complaint Right shin (and a little left), return to run plan.    Onset Timing/Date MD visit: 2/15   Mechanism Goal; return to running/jogging  Has been running very little right now.   Doing couch to 5k. Every other day - the legs hurt and make her take a break.   Maybe feels like she is improving her running gait.   Current level: walk run 1-2 miles.   Goal: 5k.   Starts to feel her symptoms towards the end of the run.   Is avoiding running downhill due to leg symptoms.      Symptom Behavior Details    Primary Symptoms Constant symptoms; worsen with activity, pain (Location: A line up the tibias on both side, Quality: Aching/Throbbing), stiffness Always seems to have a \"line\" of pain.   Having some numbness in her foot   Worst Pain 3-4/10 (with running)   Symptom Provocators Nothing else, just running   Best Pain 1/10    Symptom Relievers Activity modification, has been trying running form modifications  Tape when was seen prior by PT   Time of day dependent? Worse in evening after activity   Recent symptom change? no change in symptoms     Prior Testing/Intervention for current condition:  Prior Tests  x-ray and MRI   Prior Treatment PT      Lifestyle & General Medical History:  Employment      Usual physical activities  (within past year) Weight training 1-2x/week   Orthopaedic history See Epic Chart   Notable medical history See Epic Chart   Patient Reported Health good     Lower Extremity Physical Therapy Examination    Dynamic Movement Screen:  2 leg stance: Equal weight bearing, neutral appearing alignments.   2 leg squat:Restricted ankle DF observed and foot turns into slight external rotation    1 leg squat:   Right: proprioceptive challenge and hip shift laterally  Left: proprioceptive challenge and hip shift laterall    Gait: Normal, see running analysis below     Lower Extremity Flexibility " Screen:  Hamstrings (Supine SLR): Right: -; Left: -  Gastroc (Supine Active DF): Right: -; Left: - (CKC: 14 L, 15 R)   Quadriceps (Prone KF): Right: -; Left: -  Hip Flexors (Richard Test): Right: -; Left: -  ITB (Nicholas Test): Right: -; Left: -    Palpation:   Tender to palpation at the following structures: Posterior tib muscle belly, posterior to the tibia  Foot Screen: no limitations in midfoot or rear foot mobility    2D Video Running Gait Analysis   Reproduction of  [facility] 2D Video Analysis    Kenneth Cameron PT, PhD 2015     SAGITTAL PLANE OBSERVATIONS  Variable Right Left   Foot Strike Pattern Heel strike Heel strike   IC Tibial Inclination Angle (within 5  of vertical) Mild inclination Mild inclination   IC KF Angle (~20 ) Appropriate Appropriate   MS KF Angle (~40 ) Increased Increased   MS Ankle DF Angle   (knee over toes) Appropriate Appropriate   Push-off Hip Ext Angle (0-5 ) Decreased Decreased   Anterior Pelvic Tilt (5-10 ) Appropriate Appropriate   Lumbar Lordosis Appropriate Appropriate   COM Excursion (6-8 cm) Appropriate Appropriate     Forward Trunk Lean (5-10  forward) Decreased       FRONTAL PLANE OBSERVATIONS  Variable Right Left   Trunk Side Bend (vertical) Appropriate Appropriate   Lateral Pelvic Drop   (males 3-5 , females 4-7 ) Mild contralateral Appropriate   Knee Center Position (midline) Appropriate Appropriate   Knee Separation   (slight separation) Appropriate Appropriate   Foot-COM Position   (speed dependent) Appropriate Appropriate   Rearfoot Position   (neutral or mild pronation) Appropriate Appropriate   Forefoot Position   (neutral or mild abduction) Appropriate Appropriate     Other Observations  Pamela 160 bpm   Footwear New Balance Stability Shoe   Trunk Rotation Appropriate   Arm Swing Appropriate   Heel Height (symmetrical) Appropriate         Assessment/Plan:  Michelle presents to physical therapy with complaints of bilateral shin pain. She has  been cleared of BSI with an MRI and has been cleared to run as tolerated. She has been working on strength training which is apparent in her strength testing today. She has a lack of control with her single leg squats. A running analysis was performed, see the results above. She may benefit from shoe inserts pending her response to the tape.     Patient is a 26 year old female with both sides ankle complaints.    Patient has the following significant findings with corresponding treatment plan.                Diagnosis 1:  Shin pain  Pain -  hot/cold therapy, manual therapy, splint/taping/bracing/orthotics, self management and education  Decreased strength - therapeutic exercise, therapeutic activities and home program  Decreased proprioception - neuro re-education, therapeutic activities and home program  Impaired muscle performance - neuro re-education and home program  Decreased function - therapeutic activities and home program    Therapy Evaluation Codes:   1) History comprised of:   Personal factors that impact the plan of care:      None.    Comorbidity factors that impact the plan of care are:      None.     Medications impacting care: None.  2) Examination of Body Systems comprised of:   Body structures and functions that impact the plan of care:      Ankle.   Activity limitations that impact the plan of care are:      Running.  3) Clinical presentation characteristics are:   Stable/Uncomplicated.  4) Decision-Making    Low complexity using standardized patient assessment instrument and/or measureable assessment of functional outcome.  Cumulative Therapy Evaluation is: Low complexity.    Previous and current functional limitations:  (See Goal Flow Sheet for this information)    Short term and Long term goals: (See Goal Flow Sheet for this information)     Communication ability:  Patient appears to be able to clearly communicate and understand verbal and written communication and follow directions  correctly.  Treatment Explanation - The following has been discussed with the patient:   RX ordered/plan of care  Anticipated outcomes  Possible risks and side effects  This patient would benefit from PT intervention to resume normal activities.   Rehab potential is good.    Frequency:  1 X week, once daily  Duration:  for 6 weeks  Discharge Plan:  Achieve all LTG.  Independent in home treatment program.  Reach maximal therapeutic benefit.    Please refer to the daily flowsheet for treatment today, total treatment time and time spent performing 1:1 timed codes.

## 2022-05-04 ENCOUNTER — THERAPY VISIT (OUTPATIENT)
Dept: PHYSICAL THERAPY | Facility: CLINIC | Age: 27
End: 2022-05-04
Payer: COMMERCIAL

## 2022-05-04 DIAGNOSIS — G89.29 CHRONIC PAIN OF RIGHT ANKLE: Primary | ICD-10-CM

## 2022-05-04 DIAGNOSIS — M25.571 CHRONIC PAIN OF RIGHT ANKLE: Primary | ICD-10-CM

## 2022-05-04 PROCEDURE — 97110 THERAPEUTIC EXERCISES: CPT | Mod: GP | Performed by: PHYSICAL THERAPIST

## 2022-05-04 PROCEDURE — 97530 THERAPEUTIC ACTIVITIES: CPT | Mod: GP | Performed by: PHYSICAL THERAPIST

## 2022-09-18 ENCOUNTER — HEALTH MAINTENANCE LETTER (OUTPATIENT)
Age: 27
End: 2022-09-18

## 2023-04-06 ASSESSMENT — ENCOUNTER SYMPTOMS
DYSURIA: 0
DIARRHEA: 0
FEVER: 0
BREAST MASS: 0
EYE PAIN: 0
HEADACHES: 0
ARTHRALGIAS: 0
HEMATOCHEZIA: 0
SORE THROAT: 0
CONSTIPATION: 0
PARESTHESIAS: 0
HEMATURIA: 0
NERVOUS/ANXIOUS: 0
FREQUENCY: 0
JOINT SWELLING: 0
SHORTNESS OF BREATH: 0
COUGH: 1
MYALGIAS: 0
ABDOMINAL PAIN: 0
HEARTBURN: 1
DIZZINESS: 0
CHILLS: 0
PALPITATIONS: 0
NAUSEA: 0
WEAKNESS: 0

## 2023-04-07 ENCOUNTER — OFFICE VISIT (OUTPATIENT)
Dept: FAMILY MEDICINE | Facility: CLINIC | Age: 28
End: 2023-04-07
Payer: COMMERCIAL

## 2023-04-07 VITALS
HEART RATE: 85 BPM | WEIGHT: 203 LBS | SYSTOLIC BLOOD PRESSURE: 123 MMHG | BODY MASS INDEX: 32.62 KG/M2 | TEMPERATURE: 98.1 F | DIASTOLIC BLOOD PRESSURE: 81 MMHG | OXYGEN SATURATION: 97 % | HEIGHT: 66 IN | RESPIRATION RATE: 19 BRPM

## 2023-04-07 DIAGNOSIS — Z12.4 SCREENING FOR MALIGNANT NEOPLASM OF CERVIX: ICD-10-CM

## 2023-04-07 DIAGNOSIS — F33.0 MILD RECURRENT MAJOR DEPRESSION (H): ICD-10-CM

## 2023-04-07 DIAGNOSIS — Z30.41 SURVEILLANCE OF CONTRACEPTIVE PILL: ICD-10-CM

## 2023-04-07 DIAGNOSIS — Z13.228 SCREENING FOR ENDOCRINE, METABOLIC AND IMMUNITY DISORDER: ICD-10-CM

## 2023-04-07 DIAGNOSIS — Z13.0 SCREENING FOR DEFICIENCY ANEMIA: ICD-10-CM

## 2023-04-07 DIAGNOSIS — Z00.00 ROUTINE ADULT HEALTH MAINTENANCE: Primary | ICD-10-CM

## 2023-04-07 DIAGNOSIS — Z13.0 SCREENING FOR ENDOCRINE, METABOLIC AND IMMUNITY DISORDER: ICD-10-CM

## 2023-04-07 DIAGNOSIS — Z13.29 SCREENING FOR ENDOCRINE, METABOLIC AND IMMUNITY DISORDER: ICD-10-CM

## 2023-04-07 DIAGNOSIS — Z13.220 SCREENING CHOLESTEROL LEVEL: ICD-10-CM

## 2023-04-07 PROBLEM — M25.571 CHRONIC PAIN OF RIGHT ANKLE: Status: RESOLVED | Noted: 2022-04-20 | Resolved: 2023-04-07

## 2023-04-07 PROBLEM — G89.29 CHRONIC PAIN OF RIGHT ANKLE: Status: RESOLVED | Noted: 2022-04-20 | Resolved: 2023-04-07

## 2023-04-07 LAB
ANION GAP SERPL CALCULATED.3IONS-SCNC: 13 MMOL/L (ref 7–15)
BUN SERPL-MCNC: 12.6 MG/DL (ref 6–20)
CALCIUM SERPL-MCNC: 9.5 MG/DL (ref 8.6–10)
CHLORIDE SERPL-SCNC: 101 MMOL/L (ref 98–107)
CHOLEST SERPL-MCNC: 180 MG/DL
CREAT SERPL-MCNC: 0.96 MG/DL (ref 0.51–0.95)
DEPRECATED HCO3 PLAS-SCNC: 21 MMOL/L (ref 22–29)
ERYTHROCYTE [DISTWIDTH] IN BLOOD BY AUTOMATED COUNT: 11.8 % (ref 10–15)
GFR SERPL CREATININE-BSD FRML MDRD: 82 ML/MIN/1.73M2
GLUCOSE SERPL-MCNC: 89 MG/DL (ref 70–99)
HCT VFR BLD AUTO: 36.1 % (ref 35–47)
HDLC SERPL-MCNC: 44 MG/DL
HGB BLD-MCNC: 12.5 G/DL (ref 11.7–15.7)
LDLC SERPL CALC-MCNC: 92 MG/DL
MCH RBC QN AUTO: 28.8 PG (ref 26.5–33)
MCHC RBC AUTO-ENTMCNC: 34.6 G/DL (ref 31.5–36.5)
MCV RBC AUTO: 83 FL (ref 78–100)
NONHDLC SERPL-MCNC: 136 MG/DL
PLATELET # BLD AUTO: 345 10E3/UL (ref 150–450)
POTASSIUM SERPL-SCNC: 4.3 MMOL/L (ref 3.4–5.3)
RBC # BLD AUTO: 4.34 10E6/UL (ref 3.8–5.2)
SODIUM SERPL-SCNC: 135 MMOL/L (ref 136–145)
TRIGL SERPL-MCNC: 218 MG/DL
WBC # BLD AUTO: 9 10E3/UL (ref 4–11)

## 2023-04-07 PROCEDURE — 96127 BRIEF EMOTIONAL/BEHAV ASSMT: CPT | Performed by: PHYSICIAN ASSISTANT

## 2023-04-07 PROCEDURE — 36415 COLL VENOUS BLD VENIPUNCTURE: CPT | Performed by: PHYSICIAN ASSISTANT

## 2023-04-07 PROCEDURE — 99395 PREV VISIT EST AGE 18-39: CPT | Performed by: PHYSICIAN ASSISTANT

## 2023-04-07 PROCEDURE — G0145 SCR C/V CYTO,THINLAYER,RESCR: HCPCS | Performed by: PHYSICIAN ASSISTANT

## 2023-04-07 PROCEDURE — 80061 LIPID PANEL: CPT | Performed by: PHYSICIAN ASSISTANT

## 2023-04-07 PROCEDURE — 85027 COMPLETE CBC AUTOMATED: CPT | Performed by: PHYSICIAN ASSISTANT

## 2023-04-07 PROCEDURE — 99213 OFFICE O/P EST LOW 20 MIN: CPT | Mod: 25 | Performed by: PHYSICIAN ASSISTANT

## 2023-04-07 PROCEDURE — 80048 BASIC METABOLIC PNL TOTAL CA: CPT | Performed by: PHYSICIAN ASSISTANT

## 2023-04-07 RX ORDER — FLUOXETINE 10 MG/1
10 CAPSULE ORAL DAILY
Qty: 90 CAPSULE | Refills: 3 | Status: SHIPPED | OUTPATIENT
Start: 2023-04-07

## 2023-04-07 RX ORDER — NORGESTIMATE AND ETHINYL ESTRADIOL 0.25-0.035
1 KIT ORAL DAILY
Qty: 84 TABLET | Refills: 4 | Status: SHIPPED | OUTPATIENT
Start: 2023-04-07

## 2023-04-07 ASSESSMENT — ENCOUNTER SYMPTOMS
FEVER: 0
CONSTIPATION: 0
ARTHRALGIAS: 0
CHILLS: 0
DIZZINESS: 0
HEMATURIA: 0
JOINT SWELLING: 0
ABDOMINAL PAIN: 0
BREAST MASS: 0
PALPITATIONS: 0
EYE PAIN: 0
FREQUENCY: 0
DIARRHEA: 0
SHORTNESS OF BREATH: 0
NAUSEA: 0
MYALGIAS: 0
WEAKNESS: 0
SORE THROAT: 0
HEARTBURN: 1
COUGH: 1
DYSURIA: 0
HEADACHES: 0
HEMATOCHEZIA: 0
NERVOUS/ANXIOUS: 0
PARESTHESIAS: 0

## 2023-04-07 ASSESSMENT — ANXIETY QUESTIONNAIRES
3. WORRYING TOO MUCH ABOUT DIFFERENT THINGS: SEVERAL DAYS
7. FEELING AFRAID AS IF SOMETHING AWFUL MIGHT HAPPEN: NOT AT ALL
6. BECOMING EASILY ANNOYED OR IRRITABLE: NOT AT ALL
1. FEELING NERVOUS, ANXIOUS, OR ON EDGE: SEVERAL DAYS
2. NOT BEING ABLE TO STOP OR CONTROL WORRYING: SEVERAL DAYS
GAD7 TOTAL SCORE: 4
8. IF YOU CHECKED OFF ANY PROBLEMS, HOW DIFFICULT HAVE THESE MADE IT FOR YOU TO DO YOUR WORK, TAKE CARE OF THINGS AT HOME, OR GET ALONG WITH OTHER PEOPLE?: SOMEWHAT DIFFICULT
4. TROUBLE RELAXING: SEVERAL DAYS
GAD7 TOTAL SCORE: 4
GAD7 TOTAL SCORE: 4
5. BEING SO RESTLESS THAT IT IS HARD TO SIT STILL: NOT AT ALL
7. FEELING AFRAID AS IF SOMETHING AWFUL MIGHT HAPPEN: NOT AT ALL
IF YOU CHECKED OFF ANY PROBLEMS ON THIS QUESTIONNAIRE, HOW DIFFICULT HAVE THESE PROBLEMS MADE IT FOR YOU TO DO YOUR WORK, TAKE CARE OF THINGS AT HOME, OR GET ALONG WITH OTHER PEOPLE: SOMEWHAT DIFFICULT

## 2023-04-07 ASSESSMENT — PATIENT HEALTH QUESTIONNAIRE - PHQ9
SUM OF ALL RESPONSES TO PHQ QUESTIONS 1-9: 8
SUM OF ALL RESPONSES TO PHQ QUESTIONS 1-9: 8
10. IF YOU CHECKED OFF ANY PROBLEMS, HOW DIFFICULT HAVE THESE PROBLEMS MADE IT FOR YOU TO DO YOUR WORK, TAKE CARE OF THINGS AT HOME, OR GET ALONG WITH OTHER PEOPLE: SOMEWHAT DIFFICULT

## 2023-04-07 ASSESSMENT — PAIN SCALES - GENERAL: PAINLEVEL: NO PAIN (0)

## 2023-04-07 NOTE — PROGRESS NOTES
"   SUBJECTIVE:   CC: Michelle is an 27 year old who presents for preventive health visit. She would like to discuss the following health concerns:    Preventative health  - reports overall health is good, has gained weight over past 4 years but stays active and is not concerned for now  - would like to update routine blood work today to check cholesterol   - would like to establish care for medication management and refill fluoxetine and birth control pills today    Mental health  - reports fluoxetine is helpful for management depression and anxiety  - states her \"worse symptoms\" are happening much less frequently than they used to and she feels great and is happy with her medication  - reports lots of anxiety with medication management providers changing and she is moving across the country to start grad school in [place] next fall  - discussed transferring her prescriptions out there once she moves         4/7/2023    12:46 PM   Additional Questions   Roomed by Barb SMITH   Patient has been advised of split billing requirements and indicates understanding: Yes  Healthy Habits:     Getting at least 3 servings of Calcium per day:  Yes    Bi-annual eye exam:  Yes    Dental care twice a year:  Yes    Sleep apnea or symptoms of sleep apnea:  None    Diet:  Regular (no restrictions)    Frequency of exercise:  4-5 days/week    Duration of exercise:  45-60 minutes    Taking medications regularly:  Yes    Medication side effects:  None    PHQ-2 Total Score: 2    From [place], 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 (For example, a Health Directive, POLST, or a discussion with a medical provider or your loved ones about your wishes): No, advance care planning information given to patient to review.  Patient declined advance care planning discussion at this time.    Social History     Tobacco Use     Smoking status: Never     Smokeless tobacco: Never   Vaping Use     Vaping status: Never Used   Substance Use Topics     Alcohol use: Yes             2023    12:49 PM   Alcohol Use   Prescreen: >3 drinks/day or >7 drinks/week? No          View : No data to display.              Reviewed orders with patient.  Reviewed health maintenance and updated orders accordingly - Yes    Breast Cancer Screenin/6/2023     2:05 PM   Breast CA Risk Assessment (FHS-7)   Do you have a family history of breast, colon, or ovarian cancer? No / Unknown       Patient under 40 years of age: Routine Mammogram Screening not recommended.   Pertinent mammograms are reviewed under the imaging tab.    History of abnormal Pap smear: NO - age 21-29 PAP every 3 years recommended     Reviewed and updated as needed this visit by clinical staff   Tobacco  Allergies  Meds  Problems  Med Hx  Surg Hx  Fam Hx          Reviewed and updated as needed this visit by Provider   Tobacco   Meds  Problems  Med Hx  Surg Hx  Fam Hx           Review of Systems   Constitutional: Negative for chills and fever.   HENT: Negative for congestion, ear pain, hearing loss and sore throat.    Eyes: Negative for pain and visual disturbance.   Respiratory: Positive for cough. Negative for shortness of breath.    Cardiovascular: Negative for chest pain, palpitations and peripheral edema.   Gastrointestinal: Positive for heartburn. Negative for abdominal pain, constipation, diarrhea, hematochezia and nausea.   Breasts:  Negative for tenderness, breast mass and discharge.   Genitourinary: Negative for dysuria, frequency, genital sores,  "hematuria, pelvic pain, urgency, vaginal bleeding and vaginal discharge.   Musculoskeletal: Negative for arthralgias, joint swelling and myalgias.   Skin: Negative for rash.   Neurological: Negative for dizziness, weakness, headaches and paresthesias.   Psychiatric/Behavioral: Negative for mood changes. The patient is not nervous/anxious.         OBJECTIVE:   /81 (BP Location: Right arm, Patient Position: Sitting, Cuff Size: Adult Large)   Pulse 85   Temp 98.1  F (36.7  C) (Temporal)   Resp 19   Ht 1.676 m (5' 6\")   Wt 92.1 kg (203 lb)   LMP 03/20/2023 (Approximate)   SpO2 97%   BMI 32.77 kg/m    Physical Exam  GENERAL: healthy, alert and no distress  EYES: Eyes grossly normal to inspection, PERRL and conjunctivae and sclerae normal  HENT: ear canals and TM's normal, nose and mouth without ulcers or lesions  NECK: no adenopathy, no asymmetry, masses, or scars and thyroid normal to palpation  RESP: lungs clear to auscultation - no rales, rhonchi or wheezes  BREAST: normal without masses, tenderness or nipple discharge and no palpable axillary masses or adenopathy  CV: regular rate and rhythm, normal S1 S2, no S3 or S4, no murmur, click or rub, no peripheral edema and peripheral pulses strong  ABDOMEN: soft, nontender, no hepatosplenomegaly, no masses and bowel sounds normal   (female): normal female external genitalia, normal urethral meatus, vaginal mucosa pink, moist, well rugated, and normal cervix/adnexa/uterus without masses or discharge  MS: no gross musculoskeletal defects noted, no edema  SKIN: no suspicious lesions or rashes  NEURO: Normal strength and tone, mentation intact and speech normal  PSYCH: mentation appears normal, affect normal/bright      ASSESSMENT/PLAN:   Michelle was seen today for physical.    Diagnoses and all orders for this visit:    Routine adult health maintenance: follow up with PCP in 1 year for routine health maintenance.     Mild recurrent major depression (H): stable, " will monitor for now. Sent refills of fluoxetine to the pharmacy today.   -     FLUoxetine (PROZAC) 10 MG capsule; Take 1 capsule (10 mg) by mouth daily Take with 20mg capsule for total daily dose 30mg.  -     FLUoxetine (PROZAC) 20 MG capsule; Take 1 capsule (20 mg) by mouth daily Take with 10mg capsule daily for total daily dose of 30mg.    Surveillance of contraceptive pill: no concerns with medication, sent refills to the pharmacy today.   -     norgestimate-ethinyl estradiol (SPRINTEC 28) 0.25-35 MG-MCG tablet; Take 1 tablet by mouth daily    Screening for deficiency anemia: labs collected in clinic today as part of routine health maintenance.   -     CBC with platelets; Future  -     CBC with platelets    Screening for endocrine, metabolic and immunity disorder: labs collected in clinic today as part of routine health maintenance.   -     Basic metabolic panel; Future  -     Basic metabolic panel    Screening cholesterol level: labs collected in clinic today as part of routine health maintenance.   -     Lipid panel reflex to direct LDL Non-fasting; Future  -     Lipid panel reflex to direct LDL Non-fasting    Screening for malignant neoplasm of cervix: updated pap smear in clinic today.   -     Pap screen reflex to HPV if ASCUS - recommend age 25 - 29    COUNSELING:  Reviewed preventive health counseling, as reflected in patient instructions  Special attention given to:        Regular exercise       Healthy diet/nutrition       Contraception      She reports that she has never smoked. She has never used smokeless tobacco.             Lauren Frankenfield, PA-S  4/7/2023    Rosie Dixon PA-C  [facility]  Answers for HPI/ROS submitted by the patient on 4/7/2023  If you checked off any problems, how difficult have these problems made it for you to do your work, take care of things at home, or get along with other people?: Somewhat difficult  PHQ9 TOTAL SCORE: 8  HANNAH 7 TOTAL SCORE:  4

## 2023-04-12 LAB
BKR LAB AP GYN ADEQUACY: NORMAL
BKR LAB AP GYN INTERPRETATION: NORMAL
BKR LAB AP HPV REFLEX: NORMAL
BKR LAB AP LMP: NORMAL
BKR LAB AP PREVIOUS ABNORMAL: NORMAL
PATH REPORT.COMMENTS IMP SPEC: NORMAL
PATH REPORT.COMMENTS IMP SPEC: NORMAL
PATH REPORT.RELEVANT HX SPEC: NORMAL

## 2024-07-14 ENCOUNTER — HEALTH MAINTENANCE LETTER (OUTPATIENT)
Age: 29
End: 2024-07-14